# Patient Record
Sex: MALE | Race: WHITE | NOT HISPANIC OR LATINO | Employment: FULL TIME | URBAN - METROPOLITAN AREA
[De-identification: names, ages, dates, MRNs, and addresses within clinical notes are randomized per-mention and may not be internally consistent; named-entity substitution may affect disease eponyms.]

---

## 2017-02-15 ENCOUNTER — OFFICE VISIT (OUTPATIENT)
Dept: FAMILY MEDICINE | Facility: CLINIC | Age: 37
End: 2017-02-15
Payer: COMMERCIAL

## 2017-02-15 VITALS
WEIGHT: 259.06 LBS | OXYGEN SATURATION: 98 % | BODY MASS INDEX: 33.25 KG/M2 | HEIGHT: 74 IN | HEART RATE: 108 BPM | SYSTOLIC BLOOD PRESSURE: 120 MMHG | DIASTOLIC BLOOD PRESSURE: 64 MMHG

## 2017-02-15 DIAGNOSIS — F98.8 ATTENTION DEFICIT DISORDER OF ADULT: ICD-10-CM

## 2017-02-15 DIAGNOSIS — S16.1XXA CERVICAL STRAIN, INITIAL ENCOUNTER: ICD-10-CM

## 2017-02-15 DIAGNOSIS — F11.21 OPIOID TYPE DEPENDENCE IN REMISSION: Primary | ICD-10-CM

## 2017-02-15 PROCEDURE — 99999 PR PBB SHADOW E&M-EST. PATIENT-LVL III: CPT | Mod: PBBFAC,,,

## 2017-02-15 PROCEDURE — 99214 OFFICE O/P EST MOD 30 MIN: CPT | Mod: S$GLB,,,

## 2017-02-15 RX ORDER — DEXTROAMPHETAMINE SACCHARATE, AMPHETAMINE ASPARTATE, DEXTROAMPHETAMINE SULFATE AND AMPHETAMINE SULFATE 7.5; 7.5; 7.5; 7.5 MG/1; MG/1; MG/1; MG/1
1 TABLET ORAL DAILY
Qty: 30 TABLET | Refills: 0 | Status: SHIPPED | OUTPATIENT
Start: 2017-05-02 | End: 2017-02-15 | Stop reason: SDUPTHER

## 2017-02-15 RX ORDER — DEXTROAMPHETAMINE SACCHARATE, AMPHETAMINE ASPARTATE, DEXTROAMPHETAMINE SULFATE AND AMPHETAMINE SULFATE 7.5; 7.5; 7.5; 7.5 MG/1; MG/1; MG/1; MG/1
1 TABLET ORAL DAILY
Qty: 30 TABLET | Refills: 0 | Status: SHIPPED | OUTPATIENT
Start: 2017-04-02 | End: 2017-05-11 | Stop reason: SDUPTHER

## 2017-02-15 RX ORDER — TIZANIDINE 4 MG/1
4 TABLET ORAL EVERY 6 HOURS PRN
Qty: 30 TABLET | Refills: 2 | Status: SHIPPED | OUTPATIENT
Start: 2017-02-15 | End: 2017-02-25

## 2017-02-15 RX ORDER — BUPRENORPHINE AND NALOXONE 8; 2 MG/1; MG/1
8 FILM, SOLUBLE BUCCAL; SUBLINGUAL DAILY
Qty: 30 EACH | Refills: 2 | Status: SHIPPED | OUTPATIENT
Start: 2017-02-15 | End: 2017-05-11

## 2017-02-15 RX ORDER — DEXTROAMPHETAMINE SACCHARATE, AMPHETAMINE ASPARTATE, DEXTROAMPHETAMINE SULFATE AND AMPHETAMINE SULFATE 7.5; 7.5; 7.5; 7.5 MG/1; MG/1; MG/1; MG/1
1 TABLET ORAL DAILY
Qty: 30 TABLET | Refills: 0 | Status: SHIPPED | OUTPATIENT
Start: 2017-03-02 | End: 2017-02-15 | Stop reason: SDUPTHER

## 2017-02-15 NOTE — PATIENT INSTRUCTIONS
"You are being prescribed Buprenorphine/Naloxone for opioid dependency. Buprenorphine is an opioid medication. Buprenorphine is similar to other opioids such as morphine, codeine, and heroin however, it produces less euphoric ("high") effects and therefore may be easier to stop taking.  Naloxone blocks the effects of opioids such as morphine, codeine, and heroin. If buprenorphine and naloxone is injected, naloxone will block the effects of buprenorphine and lead to withdrawal symptoms in a person with opioid dependency. When administered under the tongue as directed, naloxone will not affect the actions of buprenorphine.    Buprenorphine and naloxone can cause drug dependence. This means that withdrawal symptoms may occur if you stop using the medicine too quickly. Withdrawal symptoms may also occur at the start of treatment due to dependence on another drug. Buprenorphine and naloxone is not for occasional ("as needed") use. Do not stop taking buprenorphine and naloxone without first talking to your doctor. Your doctor may want to gradually reduce the dose to avoid or minimize withdrawal symptoms.    Buprenorphine/Naloxone can only be prescribed by certain physicians who have had training in the field of addictive disorders and are certified by the Drug Enforcement Agency (ROLANDO). Buprenorphine/Naloxone is a controlled substance and it's use is regulated by the [place] Board of Medical Examiners, [place] [facility] board and the [employer].     It is important that you are compliant in taking Buprenorphine/Naloxone strictly as prescribed. You have signed an agreement and am aware of the the risk of physical dependency, tolerance or addition to Buprenorphine/Naloxone. You have been informed of the side effects associated with this medication including constipation. You have been informed that certain medications in the benzodiazepine class including diazepam, lorazepam, alprazolam and similar medications " "should not be taken with Buprenorphine/Naloxone. You are aware that withdrawal symptoms including yawning, sweating, watery eyes, runny nose, anxiety, tremors, aching muscles, hot and cold flashes, goose bumps, abdominal pain, diarrhea and depression can occur if you suddenly discontinue or reduce the dose you are prescribed.     You have been informed that this medication should not be taken during pregnancy and you will inform me if you do plan to become or become pregnant during treatment.     You have agreed not to obtain this or other controlled medications from other physicians while taking Buprenorphine/Naloxone. You understand that I will review a history of all controlled medications prescribed to you on the [facility] Prescription Monitoring Program Database and that you must report any other controlled medication use to your physician. You will not take opioid pain medications while taking Buprenorphine/Naloxone. If you have an emergency or acute pain crises you will inform me as soon as possible.     You will come for your regularly scheduled appointments which must be at least every 90 days. You may schedule an appointment early and I can prescribe your medication in advance with a "do not fill until" date. If you have not been evaluated in the past 90 days your prescription will not be renewed. You should schedule an appointment for your return visit before you leave the office today because if I am out of the office for vacation or other reasons there is no other physician that can prescribe this medication in my absence. If you schedule an appointment and I need to cancel that visit my office will contact you to make alternative arrangements.     You should not call for refills of this medications at night, weekends or Holidays without exception.     You have agreed not to use illegal drugs while taking Buprenorphine/Naloxone including marijuana. You understand that I can request a random " drug test at any time and that you have agreed to provide this sample without advanced notice. Failure to comply with this request could result in termination of your treatment with Buprenorphine/Naloxone.     You understand that lost, stolen, misplaced, spilled medications will not be replaced. It is your responsibility to safeguard your medication. You understand that your medication cannot be filled early for any reason including work schedule conflicts, family emergencies, travel and vacations. You also have agreed not to give or sell Buprenorphine/Naloxone to anyone.     You have agreed to take this medication under your tongue properly.     If your insurance requires a prior authorization for this medication it could result in a delay of authorization for payment of your medication. I advise that you obtain information from your insurance and pharmacist to determine if you will require a prior authorization as far in advance as possible.     It is strongly recommended that you seek outpatient substance abuse treatment in addition to medical management with Buprenorphine/Naloxone. If your insurance doesn't provide this benefit or if you can't afford counseling you can seek treatment with your local AA/NA chapter or with your [facility]. Many insurances require that you are actively in outpatient treatment before they will authorize payment for Buprenorphine/Naloxone.

## 2017-02-15 NOTE — MR AVS SNAPSHOT
[facility]  [address] [place] [address]  Phone: [phone]  Fax: [phone]                  Mingo Rico   2/15/2017 9:00 AM   Office Visit    Description:  Male : 1980   Provider:  Jake Arndt Jr., MD   Department:  [facility]           Reason for Visit     Drug / Alcohol Assessment     Neck Pain           Diagnoses this Visit        Comments    Opioid type dependence in remission    -  Primary     Attention deficit disorder of adult         Cervical strain, initial encounter                To Do List           Goals (5 Years of Data)     None      Follow-Up and Disposition     Return in about 3 months (around 5/15/2017).    Follow-up and Disposition History       These Medications        Disp Refills Start End    buprenorphine-naloxone (SUBOXONE) 8-2 mg Film 30 each 2 2/15/2017     Place 1 packet (1 each total) under the tongue once daily. - Sublingual    Pharmacy: [facility]/pharmacy #[id number] - [place], [place] - [address] Ph #: [phone]       dextroamphetamine-amphetamine 30 mg Tab 30 tablet 0 2017    Take 1 tablet by mouth once daily. - Oral    Pharmacy: [facility]/pharmacy #[id number] - [place], [place] - [address] Ph #: [phone]       tizanidine (ZANAFLEX) 4 MG tablet 30 tablet 2 2/15/2017 2017    Take 1 tablet (4 mg total) by mouth every 6 (six) hours as needed. - Oral    Pharmacy: [facility]/pharmacy #[id number] - [place], [place] - [address]  Ph #: [phone]         [facility]     [facility] Care [facility] -  Assistance  Registered nurses in the [facility] provide clinical advisement, health education, appointment booking, and other advisory services.  Call for this free service at [phone].             Medications           Message regarding Medications     Verify the changes and/or additions to your medication regime listed below are the same as discussed with your clinician today.  If any of these  "changes or additions are incorrect, please notify your healthcare provider.        START taking these NEW medications        Refills    tizanidine (ZANAFLEX) 4 MG tablet 2    Sig: Take 1 tablet (4 mg total) by mouth every 6 (six) hours as needed.    Class: Normal    Route: Oral           Verify that the below list of medications is an accurate representation of the medications you are currently taking.  If none reported, the list may be blank. If incorrect, please contact your healthcare provider. Carry this list with you in case of emergency.           Current Medications     buprenorphine-naloxone (SUBOXONE) 8-2 mg Film Place 1 packet (1 each total) under the tongue once daily.    dextroamphetamine-amphetamine 30 mg Tab Starting on Apr 02, 2017. Take 1 tablet by mouth once daily.    tizanidine (ZANAFLEX) 4 MG tablet Take 1 tablet (4 mg total) by mouth every 6 (six) hours as needed.           Clinical Reference Information           Your Vitals Were     BP Pulse Height Weight SpO2 BMI    120/64 (BP Location: Right arm, Patient Position: Sitting, BP Method: Manual) 108 6' 2" (1.88 m) 117.5 kg (259 lb 0.7 oz) 98% 33.26 kg/m2      Blood Pressure          Most Recent Value    BP  120/64      Allergies as of 2/15/2017     No Known Allergies      Immunizations Administered on Date of Encounter - 2/15/2017     None      Instructions    You are being prescribed Buprenorphine/Naloxone for opioid dependency. Buprenorphine is an opioid medication. Buprenorphine is similar to other opioids such as morphine, codeine, and heroin however, it produces less euphoric ("high") effects and therefore may be easier to stop taking.  Naloxone blocks the effects of opioids such as morphine, codeine, and heroin. If buprenorphine and naloxone is injected, naloxone will block the effects of buprenorphine and lead to withdrawal symptoms in a person with opioid dependency. When administered under the tongue as directed, naloxone will not affect " "the actions of buprenorphine.    Buprenorphine and naloxone can cause drug dependence. This means that withdrawal symptoms may occur if you stop using the medicine too quickly. Withdrawal symptoms may also occur at the start of treatment due to dependence on another drug. Buprenorphine and naloxone is not for occasional ("as needed") use. Do not stop taking buprenorphine and naloxone without first talking to your doctor. Your doctor may want to gradually reduce the dose to avoid or minimize withdrawal symptoms.    Buprenorphine/Naloxone can only be prescribed by certain physicians who have had training in the field of addictive disorders and are certified by the Drug Enforcement Agency (R[employer]). Buprenorphine/Naloxone is a controlled substance and it's use is regulated by the [facility] Board of Medical Examiners, [facility] board and the [employer].     It is important that you are compliant in taking Buprenorphine/Naloxone strictly as prescribed. You have signed an agreement and am aware of the the risk of physical dependency, tolerance or addition to Buprenorphine/Naloxone. You have been informed of the side effects associated with this medication including constipation. You have been informed that certain medications in the benzodiazepine class including diazepam, lorazepam, alprazolam and similar medications should not be taken with Buprenorphine/Naloxone. You are aware that withdrawal symptoms including yawning, sweating, watery eyes, runny nose, anxiety, tremors, aching muscles, hot and cold flashes, goose bumps, abdominal pain, diarrhea and depression can occur if you suddenly discontinue or reduce the dose you are prescribed.     You have been informed that this medication should not be taken during pregnancy and you will inform me if you do plan to become or become pregnant during treatment.     You have agreed not to obtain this or other controlled medications from other physicians while " "taking Buprenorphine/Naloxone. You understand that I will review a history of all controlled medications prescribed to you on the [facility] Prescription Monitoring Program Database and that you must report any other controlled medication use to your physician. You will not take opioid pain medications while taking Buprenorphine/Naloxone. If you have an emergency or acute pain crises you will inform me as soon as possible.     You will come for your regularly scheduled appointments which must be at least every 90 days. You may schedule an appointment early and I can prescribe your medication in advance with a "do not fill until" date. If you have not been evaluated in the past 90 days your prescription will not be renewed. You should schedule an appointment for your return visit before you leave the office today because if I am out of the office for vacation or other reasons there is no other physician that can prescribe this medication in my absence. If you schedule an appointment and I need to cancel that visit my office will contact you to make alternative arrangements.     You should not call for refills of this medications at night, weekends or Holidays without exception.     You have agreed not to use illegal drugs while taking Buprenorphine/Naloxone including marijuana. You understand that I can request a random drug test at any time and that you have agreed to provide this sample without advanced notice. Failure to comply with this request could result in termination of your treatment with Buprenorphine/Naloxone.     You understand that lost, stolen, misplaced, spilled medications will not be replaced. It is your responsibility to safeguard your medication. You understand that your medication cannot be filled early for any reason including work schedule conflicts, family emergencies, travel and vacations. You also have agreed not to give or sell Buprenorphine/Naloxone to anyone.     You have agreed " to take this medication under your tongue properly.     If your insurance requires a prior authorization for this medication it could result in a delay of authorization for payment of your medication. I advise that you obtain information from your insurance and pharmacist to determine if you will require a prior authorization as far in advance as possible.     It is strongly recommended that you seek outpatient substance abuse treatment in addition to medical management with Buprenorphine/Naloxone. If your insurance doesn't provide this benefit or if you can't afford counseling you can seek treatment with your local AA/NA chapter or with your [facility]. Many insurances require that you are actively in outpatient treatment before they will authorize payment for Buprenorphine/Naloxone.        Language Assistance Services     ATTENTION: Language assistance services are available, free of charge. Please call [phone].      ATENCIÓN: Si amita lewis, tiene a olivarez disposición servicios gratuitos de asistencia lingüística. Llame al [phone].     OBDULIA Ý: N?u b?n nói Ti?ng Vi?t, có các d?ch v? h? tr? ngôn ng? mi?n phí dành cho b?n. G?i s? [phone].         [facility] complies with applicable Federal civil rights laws and does not discriminate on the basis of race, color, national origin, age, disability, or sex.

## 2017-02-15 NOTE — PROGRESS NOTES
Subjective:       Patient ID: Mingo Rico is a 37 y.o. male.    Chief Complaint: Drug / Alcohol Assessment and Neck Pain    HPI  The patient presents for medical management of opioid dependency. he is receiving maintenance therapy with Suboxone. he is doing well and denies any craving or relapses. he denies any alcohol or substance abuse issues. he has had significant improvements in his relationships, social and occupational functioning. he claims to be compliant with treatment. I have reviewed his profile on the [place] board of Pharmacy Prescription monitoring program website and he appears to be compliant. his last refill for Suboxone 8mg # 30 was on January 15, 2017. The patient understands the risks, benefits and alternatives associated with the medical management of opioid dependency with Suboxone and has signed a Behavior and Pain Agreement for the use of Controlled Drugs.        He is complaining of pain in the right side of his neck since yesterday. His daughter jumped on him when he arrived home from work.        The patient presents for medical management of ADHD. He is taking stimulant medication and it is benefiting him by allowing him to focus and pay attention, complete task, control her hyperactivity and impulsiveness. I reviewed his profile on the [facility] Prescription monitoring website and he appears to be compliant. His last refill of Adderall 30 mg # 30 was on February 2, 2017. He denies any side effects associated with the medication.     Review of Systems   Constitutional: Negative.    Respiratory: Negative.  Negative for shortness of breath.    Cardiovascular: Negative for chest pain.   Psychiatric/Behavioral: Negative.    All other systems reviewed and are negative.      Objective:      Vitals:    02/15/17 0936   BP: 120/64   Pulse: 108     Physical Exam   Constitutional: He is oriented to person, place, and time. He appears well-developed and well-nourished.  He is cooperative. No distress.   HENT:   Head: Normocephalic and atraumatic.   Right Ear: Hearing, tympanic membrane, external ear and ear canal normal.   Left Ear: Hearing, external ear and ear canal normal.   Nose: Nose normal.   Mouth/Throat: Oropharynx is clear and moist.   Eyes: Conjunctivae are normal. Pupils are equal, round, and reactive to light.   Neck: Normal range of motion. Neck supple. No thyromegaly present.   Cardiovascular: Normal rate, regular rhythm, normal heart sounds and intact distal pulses.    Pulmonary/Chest: Effort normal and breath sounds normal. No respiratory distress.   Musculoskeletal: Normal range of motion. He exhibits tenderness. He exhibits no edema.   Base of skull on right    Lymphadenopathy:     He has no cervical adenopathy.   Neurological: He is alert and oriented to person, place, and time. He has normal strength. Coordination and gait normal.   Skin: Skin is warm, dry and intact. No cyanosis. Nails show no clubbing.   Psychiatric: He has a normal mood and affect. His speech is normal and behavior is normal. Judgment and thought content normal. Cognition and memory are normal.   Vitals reviewed.      Assessment:       1. Opioid type dependence in remission    2. Attention deficit disorder of adult    3. Cervical strain, initial encounter        Plan:       Opioid type dependence in remission  -     buprenorphine-naloxone (SUBOXONE) 8-2 mg Film; Place 1 packet (1 each total) under the tongue once daily.  Dispense: 30 each; Refill: 2    Attention deficit disorder of adult    Cervical strain, initial encounter    Other orders  -     Discontinue: dextroamphetamine-amphetamine 30 mg Tab; Take 1 tablet by mouth once daily.  Dispense: 30 tablet; Refill: 0  -     Discontinue: dextroamphetamine-amphetamine 30 mg Tab; Take 1 tablet by mouth once daily.  Dispense: 30 tablet; Refill: 0  -     dextroamphetamine-amphetamine 30 mg Tab; Take 1 tablet by mouth once daily.  Dispense: 30  tablet; Refill: 0  -     tizanidine (ZANAFLEX) 4 MG tablet; Take 1 tablet (4 mg total) by mouth every 6 (six) hours as needed.  Dispense: 30 tablet; Refill: 2      Return in about 3 months (around 5/15/2017).

## 2017-05-11 ENCOUNTER — OFFICE VISIT (OUTPATIENT)
Dept: FAMILY MEDICINE | Facility: CLINIC | Age: 37
End: 2017-05-11
Payer: COMMERCIAL

## 2017-05-11 VITALS
BODY MASS INDEX: 32.29 KG/M2 | HEART RATE: 90 BPM | SYSTOLIC BLOOD PRESSURE: 124 MMHG | OXYGEN SATURATION: 97 % | DIASTOLIC BLOOD PRESSURE: 82 MMHG | RESPIRATION RATE: 18 BRPM | WEIGHT: 251.56 LBS | HEIGHT: 74 IN

## 2017-05-11 DIAGNOSIS — F98.8 ATTENTION DEFICIT DISORDER OF ADULT: ICD-10-CM

## 2017-05-11 DIAGNOSIS — F11.21 OPIOID TYPE DEPENDENCE IN REMISSION: Primary | ICD-10-CM

## 2017-05-11 PROCEDURE — 99999 PR PBB SHADOW E&M-EST. PATIENT-LVL III: CPT | Mod: PBBFAC,,,

## 2017-05-11 PROCEDURE — 1160F RVW MEDS BY RX/DR IN RCRD: CPT | Mod: S$GLB,,,

## 2017-05-11 PROCEDURE — 99214 OFFICE O/P EST MOD 30 MIN: CPT | Mod: S$GLB,,,

## 2017-05-11 RX ORDER — DEXTROAMPHETAMINE SACCHARATE, AMPHETAMINE ASPARTATE, DEXTROAMPHETAMINE SULFATE AND AMPHETAMINE SULFATE 7.5; 7.5; 7.5; 7.5 MG/1; MG/1; MG/1; MG/1
1 TABLET ORAL DAILY
Qty: 30 TABLET | Refills: 0 | Status: SHIPPED | OUTPATIENT
Start: 2017-06-02 | End: 2017-08-15 | Stop reason: SDUPTHER

## 2017-05-11 RX ORDER — BUPRENORPHINE AND NALOXONE 8; 2 MG/1; MG/1
1 FILM, SOLUBLE BUCCAL; SUBLINGUAL DAILY
Qty: 30 EACH | Refills: 2 | Status: SHIPPED | OUTPATIENT
Start: 2017-05-11 | End: 2017-08-15 | Stop reason: SDUPTHER

## 2017-05-11 RX ORDER — DEXTROAMPHETAMINE SACCHARATE, AMPHETAMINE ASPARTATE, DEXTROAMPHETAMINE SULFATE AND AMPHETAMINE SULFATE 7.5; 7.5; 7.5; 7.5 MG/1; MG/1; MG/1; MG/1
1 TABLET ORAL DAILY
Qty: 30 TABLET | Refills: 0 | Status: SHIPPED | OUTPATIENT
Start: 2017-06-11 | End: 2017-05-11 | Stop reason: SDUPTHER

## 2017-05-11 RX ORDER — DEXTROAMPHETAMINE SACCHARATE, AMPHETAMINE ASPARTATE, DEXTROAMPHETAMINE SULFATE AND AMPHETAMINE SULFATE 7.5; 7.5; 7.5; 7.5 MG/1; MG/1; MG/1; MG/1
1 TABLET ORAL DAILY
Qty: 30 TABLET | Refills: 0 | Status: SHIPPED | OUTPATIENT
Start: 2017-05-11 | End: 2017-05-11 | Stop reason: SDUPTHER

## 2017-05-11 RX ORDER — DEXTROAMPHETAMINE SACCHARATE, AMPHETAMINE ASPARTATE, DEXTROAMPHETAMINE SULFATE AND AMPHETAMINE SULFATE 7.5; 7.5; 7.5; 7.5 MG/1; MG/1; MG/1; MG/1
1 TABLET ORAL DAILY
Qty: 30 TABLET | Refills: 0 | Status: SHIPPED | OUTPATIENT
Start: 2017-07-11 | End: 2017-05-11 | Stop reason: SDUPTHER

## 2017-05-11 RX ORDER — DEXTROAMPHETAMINE SACCHARATE, AMPHETAMINE ASPARTATE, DEXTROAMPHETAMINE SULFATE AND AMPHETAMINE SULFATE 7.5; 7.5; 7.5; 7.5 MG/1; MG/1; MG/1; MG/1
1 TABLET ORAL DAILY
Refills: 0 | COMMUNITY
Start: 2017-05-03 | End: 2017-05-11 | Stop reason: SDUPTHER

## 2017-05-11 RX ORDER — DEXTROAMPHETAMINE SACCHARATE, AMPHETAMINE ASPARTATE, DEXTROAMPHETAMINE SULFATE AND AMPHETAMINE SULFATE 7.5; 7.5; 7.5; 7.5 MG/1; MG/1; MG/1; MG/1
1 TABLET ORAL DAILY
Qty: 30 TABLET | Refills: 0 | Status: SHIPPED | OUTPATIENT
Start: 2017-07-03 | End: 2017-08-15 | Stop reason: SDUPTHER

## 2017-05-11 RX ORDER — DEXTROAMPHETAMINE SACCHARATE, AMPHETAMINE ASPARTATE, DEXTROAMPHETAMINE SULFATE AND AMPHETAMINE SULFATE 7.5; 7.5; 7.5; 7.5 MG/1; MG/1; MG/1; MG/1
1 TABLET ORAL DAILY
Qty: 30 TABLET | Refills: 0 | Status: SHIPPED | OUTPATIENT
Start: 2017-08-02 | End: 2017-08-15 | Stop reason: SDUPTHER

## 2017-05-11 NOTE — PATIENT INSTRUCTIONS
"You are being prescribed Buprenorphine/Naloxone for opioid dependency. Buprenorphine is an opioid medication. Buprenorphine is similar to other opioids such as morphine, codeine, and heroin however, it produces less euphoric ("high") effects and therefore may be easier to stop taking.  Naloxone blocks the effects of opioids such as morphine, codeine, and heroin. If buprenorphine and naloxone is injected, naloxone will block the effects of buprenorphine and lead to withdrawal symptoms in a person with opioid dependency. When administered under the tongue as directed, naloxone will not affect the actions of buprenorphine.    Buprenorphine and naloxone can cause drug dependence. This means that withdrawal symptoms may occur if you stop using the medicine too quickly. Withdrawal symptoms may also occur at the start of treatment due to dependence on another drug. Buprenorphine and naloxone is not for occasional ("as needed") use. Do not stop taking buprenorphine and naloxone without first talking to your doctor. Your doctor may want to gradually reduce the dose to avoid or minimize withdrawal symptoms.    Buprenorphine/Naloxone can only be prescribed by certain physicians who have had training in the field of addictive disorders and are certified by the Drug Enforcement Agency ([employer]). Buprenorphine/Naloxone is a controlled substance and it's use is regulated by the [place] Board of Medical Examiners, [place] [employer] [facility] board and the [employer].     It is important that you are compliant in taking Buprenorphine/Naloxone strictly as prescribed. You have signed an agreement and am aware of the the risk of physical dependency, tolerance or addition to Buprenorphine/Naloxone. You have been informed of the side effects associated with this medication including constipation. You have been informed that certain medications in the benzodiazepine class including diazepam, lorazepam, alprazolam and similar medications " "should not be taken with Buprenorphine/Naloxone. You are aware that withdrawal symptoms including yawning, sweating, watery eyes, runny nose, anxiety, tremors, aching muscles, hot and cold flashes, goose bumps, abdominal pain, diarrhea and depression can occur if you suddenly discontinue or reduce the dose you are prescribed.     You have been informed that this medication should not be taken during pregnancy and you will inform me if you do plan to become or become pregnant during treatment.     You have agreed not to obtain this or other controlled medications from other physicians while taking Buprenorphine/Naloxone. You understand that I will review a history of all controlled medications prescribed to you on the [facility] Prescription Monitoring Program Database and that you must report any other controlled medication use to your physician. You will not take opioid pain medications while taking Buprenorphine/Naloxone. If you have an emergency or acute pain crises you will inform me as soon as possible.     You will come for your regularly scheduled appointments which must be at least every 90 days. You may schedule an appointment early and I can prescribe your medication in advance with a "do not fill until" date. If you have not been evaluated in the past 90 days your prescription will not be renewed. You should schedule an appointment for your return visit before you leave the office today because if I am out of the office for vacation or other reasons there is no other physician that can prescribe this medication in my absence. If you schedule an appointment and I need to cancel that visit my office will contact you to make alternative arrangements.     You should not call for refills of this medications at night, weekends or Holidays without exception.     You have agreed not to use illegal drugs while taking Buprenorphine/Naloxone including marijuana. You understand that I can request a random " drug test at any time and that you have agreed to provide this sample without advanced notice. Failure to comply with this request could result in termination of your treatment with Buprenorphine/Naloxone.     You understand that lost, stolen, misplaced, spilled medications will not be replaced. It is your responsibility to safeguard your medication. You understand that your medication cannot be filled early for any reason including work schedule conflicts, family emergencies, travel and vacations. You also have agreed not to give or sell Buprenorphine/Naloxone to anyone.     You have agreed to take this medication under your tongue properly.     If your insurance requires a prior authorization for this medication it could result in a delay of authorization for payment of your medication. I advise that you obtain information from your insurance and pharmacist to determine if you will require a prior authorization as far in advance as possible.     It is strongly recommended that you seek outpatient substance abuse treatment in addition to medical management with Buprenorphine/Naloxone. If your insurance doesn't provide this benefit or if you can't afford counseling you can seek treatment with your local AA/NA chapter or with your [facility]. Many insurances require that you are actively in outpatient treatment before they will authorize payment for Buprenorphine/Naloxone.

## 2017-05-11 NOTE — PROGRESS NOTES
Subjective:       Patient ID: Mingo Rico is a 37 y.o. male.    Chief Complaint: Drug / Alcohol Assessment    HPI The patient presents for medical management of opioid dependency. he is receiving maintenance therapy with Suboxone. he is doing well and denies any craving or relapses. he denies any alcohol or substance abuse issues. he has had significant improvements in his relationships, social and occupational functioning. he claims to be compliant with treatment. I have reviewed his profile on the [facility] Prescription monitoring program website and he appears to be compliant. his last refill for Suboxone 8mg # 30 was on May 3, 2017. The patient understands the risks, benefits and alternatives associated with the medical management of opioid dependency with Suboxone and has signed a Behavior and Pain Agreement for the use of Controlled Drugs.         The patient presents for medical management of ADHD. He is taking stimulant medication and it is benefiting him by allowing him to focus and pay attention, complete task, control her hyperactivity and impulsiveness. I reviewed his profile on the [facility] Prescription monitoring website and he appears to be compliant. His last refill of Adderall 30 mg # 30 was on April 15, 2017. He denies any side effects associated with the medication.     Review of Systems   Constitutional: Negative.    Respiratory: Negative.  Negative for shortness of breath.    Cardiovascular: Negative for chest pain.   Psychiatric/Behavioral: Negative.    All other systems reviewed and are negative.      Objective:      Vitals:    05/11/17 1045   BP: 124/82   Pulse: 90   Resp: 18     Physical Exam   Constitutional: He is oriented to person, place, and time. He appears well-developed and well-nourished. He is cooperative. No distress.   HENT:   Head: Normocephalic and atraumatic.   Right Ear: Hearing, tympanic membrane, external ear and ear canal  normal.   Left Ear: Hearing, external ear and ear canal normal.   Nose: Nose normal.   Mouth/Throat: Oropharynx is clear and moist.   Eyes: Conjunctivae are normal. Pupils are equal, round, and reactive to light.   Neck: Normal range of motion. Neck supple. No thyromegaly present.   Cardiovascular: Normal rate, regular rhythm, normal heart sounds and intact distal pulses.    Pulmonary/Chest: Effort normal and breath sounds normal. No respiratory distress.   Musculoskeletal: Normal range of motion. He exhibits no edema or tenderness.   Lymphadenopathy:     He has no cervical adenopathy.   Neurological: He is alert and oriented to person, place, and time. He has normal strength. Coordination and gait normal.   Skin: Skin is warm, dry and intact. No cyanosis. Nails show no clubbing.   Psychiatric: He has a normal mood and affect. His speech is normal and behavior is normal. Judgment and thought content normal. Cognition and memory are normal.   Vitals reviewed.      Assessment:       1. Opioid type dependence in remission    2. Attention deficit disorder of adult        Plan:       Opioid type dependence in remission  -     Cancel: Buprenorphine and Norbuprenorphine,urine  -     Cancel: Toxicology screen, urine  -     buprenorphine-naloxone (SUBOXONE) 8-2 mg Film; Place 1 packet (1 each total) under the tongue once daily.  Dispense: 30 each; Refill: 2    Attention deficit disorder of adult    Other orders  -     dextroamphetamine-amphetamine 30 mg Tab; Take 1 tablet by mouth once daily.  Dispense: 30 tablet; Refill: 0  -     dextroamphetamine-amphetamine 30 mg Tab; Take 1 tablet by mouth once daily.  Dispense: 30 tablet; Refill: 0  -     dextroamphetamine-amphetamine (ADDERALL) 30 mg Tab; Take 1 tablet by mouth once daily.  Dispense: 30 tablet; Refill: 0      Return in about 3 months (around 8/11/2017).

## 2017-08-11 ENCOUNTER — PATIENT MESSAGE (OUTPATIENT)
Dept: FAMILY MEDICINE | Facility: CLINIC | Age: 37
End: 2017-08-11

## 2017-08-15 ENCOUNTER — OFFICE VISIT (OUTPATIENT)
Dept: FAMILY MEDICINE | Facility: CLINIC | Age: 37
End: 2017-08-15
Payer: COMMERCIAL

## 2017-08-15 VITALS
BODY MASS INDEX: 30.84 KG/M2 | OXYGEN SATURATION: 96 % | SYSTOLIC BLOOD PRESSURE: 110 MMHG | DIASTOLIC BLOOD PRESSURE: 70 MMHG | WEIGHT: 248 LBS | HEART RATE: 88 BPM | HEIGHT: 75 IN

## 2017-08-15 DIAGNOSIS — F11.21 OPIOID TYPE DEPENDENCE IN REMISSION: Primary | ICD-10-CM

## 2017-08-15 DIAGNOSIS — F98.8 ATTENTION DEFICIT DISORDER OF ADULT: ICD-10-CM

## 2017-08-15 PROCEDURE — 99999 PR PBB SHADOW E&M-EST. PATIENT-LVL III: CPT | Mod: PBBFAC,,,

## 2017-08-15 PROCEDURE — 99214 OFFICE O/P EST MOD 30 MIN: CPT | Mod: S$GLB,,,

## 2017-08-15 PROCEDURE — 3008F BODY MASS INDEX DOCD: CPT | Mod: S$GLB,,,

## 2017-08-15 RX ORDER — DEXTROAMPHETAMINE SACCHARATE, AMPHETAMINE ASPARTATE, DEXTROAMPHETAMINE SULFATE AND AMPHETAMINE SULFATE 7.5; 7.5; 7.5; 7.5 MG/1; MG/1; MG/1; MG/1
1 TABLET ORAL DAILY
Qty: 30 TABLET | Refills: 0 | Status: SHIPPED | OUTPATIENT
Start: 2017-11-05 | End: 2017-11-14 | Stop reason: SDUPTHER

## 2017-08-15 RX ORDER — BUPRENORPHINE AND NALOXONE 8; 2 MG/1; MG/1
1 FILM, SOLUBLE BUCCAL; SUBLINGUAL DAILY
Qty: 30 EACH | Refills: 2 | Status: SHIPPED | OUTPATIENT
Start: 2017-08-15 | End: 2017-11-14 | Stop reason: SDUPTHER

## 2017-08-15 RX ORDER — DEXTROAMPHETAMINE SACCHARATE, AMPHETAMINE ASPARTATE, DEXTROAMPHETAMINE SULFATE AND AMPHETAMINE SULFATE 7.5; 7.5; 7.5; 7.5 MG/1; MG/1; MG/1; MG/1
1 TABLET ORAL DAILY
Qty: 30 TABLET | Refills: 0 | Status: SHIPPED | OUTPATIENT
Start: 2017-09-05 | End: 2017-11-14 | Stop reason: SDUPTHER

## 2017-08-15 RX ORDER — DEXTROAMPHETAMINE SACCHARATE, AMPHETAMINE ASPARTATE, DEXTROAMPHETAMINE SULFATE AND AMPHETAMINE SULFATE 7.5; 7.5; 7.5; 7.5 MG/1; MG/1; MG/1; MG/1
1 TABLET ORAL DAILY
Qty: 30 TABLET | Refills: 0 | Status: SHIPPED | OUTPATIENT
Start: 2017-10-05 | End: 2017-11-14 | Stop reason: SDUPTHER

## 2017-08-15 NOTE — PROGRESS NOTES
Subjective:       Patient ID: Mingo Rico is a 37 y.o. male.    Chief Complaint: Drug / Alcohol Assessment and ADHD    HPI The patient presents for medical management of opioid dependency. he is receiving maintenance therapy with Suboxone. he is doing well and denies any craving or relapses. he denies any alcohol or substance abuse issues. he has had significant improvements in his relationships, social and occupational functioning. he claims to be compliant with treatment. I have reviewed his profile on the [facility] Prescription monitoring program website and he appears to be compliant. his last refill for Suboxone 8mg # 30 was on July 16, 2017. The patient understands the risks, benefits and alternatives associated with the medical management of opioid dependency with Suboxone and has signed a Behavior and Pain Agreement for the use of Controlled Drugs.          The patient presents for medical management of ADHD. He is taking stimulant medication and it is benefiting him by allowing him to focus and pay attention, complete task, control her hyperactivity and impulsiveness. I reviewed his profile on the [facility] Prescription monitoring website and he appears to be compliant. His last refill of Adderall 30 mg # 30 was on July 16, 2017. He denies any side effects associated with the medication.     Review of Systems   Constitutional: Negative.    Respiratory: Negative.  Negative for shortness of breath.    Cardiovascular: Negative for chest pain.   Psychiatric/Behavioral: Negative.    All other systems reviewed and are negative.      Objective:      Vitals:    08/15/17 0930   BP: 110/70   Pulse: 88     Physical Exam   Constitutional: He is oriented to person, place, and time. He appears well-developed and well-nourished. He is cooperative. No distress.   HENT:   Head: Normocephalic and atraumatic.   Right Ear: Hearing, tympanic membrane, external ear and ear canal  normal.   Left Ear: Hearing, external ear and ear canal normal.   Nose: Nose normal.   Mouth/Throat: Oropharynx is clear and moist.   Eyes: Conjunctivae are normal. Pupils are equal, round, and reactive to light.   Neck: Normal range of motion. Neck supple. No thyromegaly present.   Cardiovascular: Normal rate, regular rhythm, normal heart sounds and intact distal pulses.    Pulmonary/Chest: Effort normal and breath sounds normal. No respiratory distress.   Musculoskeletal: Normal range of motion. He exhibits no edema or tenderness.   Lymphadenopathy:     He has no cervical adenopathy.   Neurological: He is alert and oriented to person, place, and time. He has normal strength. Coordination and gait normal.   Skin: Skin is warm, dry and intact. No cyanosis. Nails show no clubbing.   Psychiatric: He has a normal mood and affect. His speech is normal and behavior is normal. Judgment and thought content normal. Cognition and memory are normal.   Vitals reviewed.      Assessment:       1. Opioid type dependence in remission    2. Attention deficit disorder of adult        Plan:       Opioid type dependence in remission  -     buprenorphine-naloxone (SUBOXONE) 8-2 mg Film; Place 1 packet (1 each total) under the tongue once daily.  Dispense: 30 each; Refill: 2    Attention deficit disorder of adult    Other orders  -     dextroamphetamine-amphetamine (ADDERALL) 30 mg Tab; Take 1 tablet by mouth once daily.  Dispense: 30 tablet; Refill: 0  -     dextroamphetamine-amphetamine 30 mg Tab; Take 1 tablet by mouth once daily.  Dispense: 30 tablet; Refill: 0  -     dextroamphetamine-amphetamine 30 mg Tab; Take 1 tablet by mouth once daily.  Dispense: 30 tablet; Refill: 0      Return in about 3 months (around 11/15/2017).

## 2017-08-15 NOTE — PATIENT INSTRUCTIONS
"You are being prescribed Buprenorphine/Naloxone for opioid dependency. Buprenorphine is an opioid medication. Buprenorphine is similar to other opioids such as morphine, codeine, and heroin however, it produces less euphoric ("high") effects and therefore may be easier to stop taking.  Naloxone blocks the effects of opioids such as morphine, codeine, and heroin. If buprenorphine and naloxone is injected, naloxone will block the effects of buprenorphine and lead to withdrawal symptoms in a person with opioid dependency. When administered under the tongue as directed, naloxone will not affect the actions of buprenorphine.    Buprenorphine and naloxone can cause drug dependence. This means that withdrawal symptoms may occur if you stop using the medicine too quickly. Withdrawal symptoms may also occur at the start of treatment due to dependence on another drug. Buprenorphine and naloxone is not for occasional ("as needed") use. Do not stop taking buprenorphine and naloxone without first talking to your doctor. Your doctor may want to gradually reduce the dose to avoid or minimize withdrawal symptoms.    Buprenorphine/Naloxone can only be prescribed by certain physicians who have had training in the field of addictive disorders and are certified by the Drug Enforcement Agency ([employer]). Buprenorphine/Naloxone is a controlled substance and it's use is regulated by the [place] Board of Medical Examiners, [place] [employer] [facility] board and the [employer].     It is important that you are compliant in taking Buprenorphine/Naloxone strictly as prescribed. You have signed an agreement and am aware of the the risk of physical dependency, tolerance or addition to Buprenorphine/Naloxone. You have been informed of the side effects associated with this medication including constipation. You have been informed that certain medications in the benzodiazepine class including diazepam, lorazepam, alprazolam and similar medications " "should not be taken with Buprenorphine/Naloxone. You are aware that withdrawal symptoms including yawning, sweating, watery eyes, runny nose, anxiety, tremors, aching muscles, hot and cold flashes, goose bumps, abdominal pain, diarrhea and depression can occur if you suddenly discontinue or reduce the dose you are prescribed.     You have been informed that this medication should not be taken during pregnancy and you will inform me if you do plan to become or become pregnant during treatment.     You have agreed not to obtain this or other controlled medications from other physicians while taking Buprenorphine/Naloxone. You understand that I will review a history of all controlled medications prescribed to you on the [facility] Prescription Monitoring Program Database and that you must report any other controlled medication use to your physician. You will not take opioid pain medications while taking Buprenorphine/Naloxone. If you have an emergency or acute pain crises you will inform me as soon as possible.     You will come for your regularly scheduled appointments which must be at least every 90 days. You may schedule an appointment early and I can prescribe your medication in advance with a "do not fill until" date. If you have not been evaluated in the past 90 days your prescription will not be renewed. You should schedule an appointment for your return visit before you leave the office today because if I am out of the office for vacation or other reasons there is no other physician that can prescribe this medication in my absence. If you schedule an appointment and I need to cancel that visit my office will contact you to make alternative arrangements.     You should not call for refills of this medications at night, weekends or Holidays without exception.     You have agreed not to use illegal drugs while taking Buprenorphine/Naloxone including marijuana. You understand that I can request a random " drug test at any time and that you have agreed to provide this sample without advanced notice. Failure to comply with this request could result in termination of your treatment with Buprenorphine/Naloxone.     You understand that lost, stolen, misplaced, spilled medications will not be replaced. It is your responsibility to safeguard your medication. You understand that your medication cannot be filled early for any reason including work schedule conflicts, family emergencies, travel and vacations. You also have agreed not to give or sell Buprenorphine/Naloxone to anyone.     You have agreed to take this medication under your tongue properly.     If your insurance requires a prior authorization for this medication it could result in a delay of authorization for payment of your medication. I advise that you obtain information from your insurance and pharmacist to determine if you will require a prior authorization as far in advance as possible.     It is strongly recommended that you seek outpatient substance abuse treatment in addition to medical management with Buprenorphine/Naloxone. If your insurance doesn't provide this benefit or if you can't afford counseling you can seek treatment with your local AA/NA chapter or with your [facility]. Many insurances require that you are actively in outpatient treatment before they will authorize payment for Buprenorphine/Naloxone.

## 2017-11-14 ENCOUNTER — OFFICE VISIT (OUTPATIENT)
Dept: FAMILY MEDICINE | Facility: CLINIC | Age: 37
End: 2017-11-14
Payer: COMMERCIAL

## 2017-11-14 VITALS
HEIGHT: 75 IN | DIASTOLIC BLOOD PRESSURE: 70 MMHG | SYSTOLIC BLOOD PRESSURE: 120 MMHG | HEART RATE: 56 BPM | OXYGEN SATURATION: 99 % | WEIGHT: 236.81 LBS | BODY MASS INDEX: 29.45 KG/M2

## 2017-11-14 DIAGNOSIS — F11.20 OPIOID DEPENDENCE ON AGONIST THERAPY: Primary | ICD-10-CM

## 2017-11-14 DIAGNOSIS — F11.21 OPIOID TYPE DEPENDENCE IN REMISSION: ICD-10-CM

## 2017-11-14 DIAGNOSIS — F98.8 ATTENTION DEFICIT DISORDER OF ADULT: ICD-10-CM

## 2017-11-14 PROCEDURE — 99999 PR PBB SHADOW E&M-EST. PATIENT-LVL III: CPT | Mod: PBBFAC,,,

## 2017-11-14 PROCEDURE — 99214 OFFICE O/P EST MOD 30 MIN: CPT | Mod: S$GLB,,,

## 2017-11-14 RX ORDER — DEXTROAMPHETAMINE SACCHARATE, AMPHETAMINE ASPARTATE, DEXTROAMPHETAMINE SULFATE AND AMPHETAMINE SULFATE 7.5; 7.5; 7.5; 7.5 MG/1; MG/1; MG/1; MG/1
1 TABLET ORAL DAILY
Qty: 30 TABLET | Refills: 0 | Status: SHIPPED | OUTPATIENT
Start: 2018-01-14 | End: 2018-02-13

## 2017-11-14 RX ORDER — DEXTROAMPHETAMINE SACCHARATE, AMPHETAMINE ASPARTATE, DEXTROAMPHETAMINE SULFATE AND AMPHETAMINE SULFATE 7.5; 7.5; 7.5; 7.5 MG/1; MG/1; MG/1; MG/1
1 TABLET ORAL DAILY
Qty: 30 TABLET | Refills: 0 | Status: SHIPPED | OUTPATIENT
Start: 2017-11-14 | End: 2018-02-14 | Stop reason: SDUPTHER

## 2017-11-14 RX ORDER — BUPRENORPHINE AND NALOXONE 8; 2 MG/1; MG/1
1 FILM, SOLUBLE BUCCAL; SUBLINGUAL DAILY
Qty: 30 EACH | Refills: 2 | Status: SHIPPED | OUTPATIENT
Start: 2017-11-14 | End: 2017-12-14 | Stop reason: SDUPTHER

## 2017-11-14 RX ORDER — DEXTROAMPHETAMINE SACCHARATE, AMPHETAMINE ASPARTATE, DEXTROAMPHETAMINE SULFATE AND AMPHETAMINE SULFATE 7.5; 7.5; 7.5; 7.5 MG/1; MG/1; MG/1; MG/1
1 TABLET ORAL DAILY
Qty: 30 TABLET | Refills: 0 | Status: SHIPPED | OUTPATIENT
Start: 2017-12-14 | End: 2018-02-14

## 2017-11-14 NOTE — PROGRESS NOTES
Subjective:       Patient ID: Mingo Rico is a 37 y.o. male.    Chief Complaint: Drug / Alcohol Assessment    HPI The patient presents for medical management of opioid dependency. he is receiving maintenance therapy with Suboxone. he is doing well and denies any craving or relapses. he denies any alcohol or substance abuse issues. he has had significant improvements in his relationships, social and occupational functioning. he claims to be compliant with treatment. I have reviewed his profile on the [facility] of Pharmacy Prescription monitoring program website and he appears to be compliant. his last refill for Suboxone 8mg # 30 was on October 15, 2017. The patient understands the risks, benefits and alternatives associated with the medical management of opioid dependency with Suboxone and has signed a Behavior and Pain Agreement for the use of Controlled Drugs.     Review of Systems   Constitutional: Negative.    Respiratory: Negative.  Negative for shortness of breath.    Cardiovascular: Negative for chest pain.   Psychiatric/Behavioral: Negative.    All other systems reviewed and are negative.      Objective:      Vitals:    11/14/17 1518   BP: 120/70   Pulse: (!) 56     Physical Exam   Constitutional: He is oriented to person, place, and time. He appears well-developed and well-nourished. He is cooperative. No distress.   HENT:   Head: Normocephalic and atraumatic.   Right Ear: Hearing, tympanic membrane, external ear and ear canal normal.   Left Ear: Hearing, external ear and ear canal normal.   Nose: Nose normal.   Mouth/Throat: Oropharynx is clear and moist.   Eyes: Conjunctivae are normal. Pupils are equal, round, and reactive to light.   Neck: Normal range of motion. Neck supple. No thyromegaly present.   Cardiovascular: Normal rate, regular rhythm, normal heart sounds and intact distal pulses.    Pulmonary/Chest: Effort normal and breath sounds normal. No respiratory distress.    Musculoskeletal: Normal range of motion. He exhibits no edema or tenderness.   Lymphadenopathy:     He has no cervical adenopathy.   Neurological: He is alert and oriented to person, place, and time. He has normal strength. Coordination and gait normal.   Skin: Skin is warm, dry and intact. No cyanosis. Nails show no clubbing.   Psychiatric: He has a normal mood and affect. His speech is normal and behavior is normal. Judgment and thought content normal. Cognition and memory are normal.   Vitals reviewed.      Assessment:       1. Opioid dependence on agonist therapy    2. Attention deficit disorder of adult    3. Opioid type dependence in remission        Plan:       Opioid dependence on agonist therapy    Attention deficit disorder of adult    Opioid type dependence in remission  -     buprenorphine-naloxone (SUBOXONE) 8-2 mg Film; Place 1 packet (1 each total) under the tongue once daily.  Dispense: 30 each; Refill: 2    Other orders  -     dextroamphetamine-amphetamine (ADDERALL) 30 mg Tab; Take 1 tablet by mouth once daily.  Dispense: 30 tablet; Refill: 0  -     dextroamphetamine-amphetamine 30 mg Tab; Take 1 tablet by mouth once daily.  Dispense: 30 tablet; Refill: 0  -     dextroamphetamine-amphetamine 30 mg Tab; Take 1 tablet by mouth once daily.  Dispense: 30 tablet; Refill: 0      Return in about 3 months (around 2/14/2018).

## 2017-11-14 NOTE — PATIENT INSTRUCTIONS
"You are being prescribed opioid pain medications for chronic pain.     Opioid pain medications can cause drug dependence. This means that withdrawal symptoms may occur if you stop using the medicine too quickly.     Opioid pain medications can only be prescribed in compliance with chronic opioid pain medication regulations of the [facility] Board of Medical Examiners, the [facility] board and the Federal Drug Enforcement Agency ([place]).     It is important that you are compliant in taking Opioid pain medications strictly as prescribed. You have signed an agreement and are aware of the risk of physical dependency, tolerance or addiction to Opioid pain medications. You have been informed of the side effects associated with this medication including constipation. You are aware that withdrawal symptoms including yawning, sweating, watery eyes, runny nose, anxiety, tremors, aching muscles, hot and cold flashes, goose bumps, abdominal pain, diarrhea and depression can occur if you suddenly discontinue or reduce the dose you are prescribed.     You have been informed that this medication should not be taken during pregnancy and you will inform me if you do plan to become or become pregnant during treatment.     You have agreed not to obtain this or other controlled medications from other physicians while taking Opioid pain medications. You understand that I will review a history of all controlled medications prescribed to you on the [facility] Prescription Monitoring Program Database and that you must report any other controlled medication use to your physician. If you have an emergency or acute pain crisis you will inform me as soon as possible.     You will come for your regularly scheduled appointments which must be at least every 90 days. You may schedule an appointment early and I can prescribe your medication in advance with a "do not fill until" date. If you have not been evaluated in the " past 90 days your prescription will not be renewed. You should schedule an appointment for your return visit before you leave the office today because if I am out of the office for vacation or other reasons there is no other physician that can prescribe this medication in my absence. If you schedule an appointment and I need to cancel that visit my office will contact you to make alternative arrangements.     You should not call for refills of this medication at night, weekends or Holidays without exception.     You have agreed not to use illegal drugs while taking Opioid pain medications including marijuana. You understand that I can request a random drug test at any time and that you have agreed to provide this sample without advanced notice. Failure to comply with this request could result in termination of your treatment with Opioid pain medications.     You understand that lost, stolen, misplaced, spilled medications will not be replaced. It is your responsibility to safeguard your medication. You understand that your medication cannot be filled early for any reason including work schedule conflicts, family emergencies, travel and vacations. You also have agreed not to give or sell Opioid pain medications to anyone.

## 2017-12-14 DIAGNOSIS — F11.21 OPIOID TYPE DEPENDENCE IN REMISSION: ICD-10-CM

## 2017-12-14 RX ORDER — BUPRENORPHINE HYDROCHLORIDE, NALOXONE HYDROCHLORIDE 8; 2 MG/1; MG/1
FILM, SOLUBLE BUCCAL; SUBLINGUAL
Qty: 30 EACH | Refills: 1 | Status: SHIPPED | OUTPATIENT
Start: 2017-12-14 | End: 2018-02-14 | Stop reason: SDUPTHER

## 2018-02-14 ENCOUNTER — OFFICE VISIT (OUTPATIENT)
Dept: FAMILY MEDICINE | Facility: CLINIC | Age: 38
End: 2018-02-14

## 2018-02-14 VITALS
OXYGEN SATURATION: 94 % | BODY MASS INDEX: 30.8 KG/M2 | HEIGHT: 74 IN | WEIGHT: 240 LBS | DIASTOLIC BLOOD PRESSURE: 80 MMHG | HEART RATE: 101 BPM | SYSTOLIC BLOOD PRESSURE: 110 MMHG

## 2018-02-14 DIAGNOSIS — F98.8 ATTENTION DEFICIT DISORDER OF ADULT: ICD-10-CM

## 2018-02-14 DIAGNOSIS — F11.21 OPIOID TYPE DEPENDENCE IN REMISSION: ICD-10-CM

## 2018-02-14 DIAGNOSIS — F11.20 OPIOID DEPENDENCE ON AGONIST THERAPY: Primary | ICD-10-CM

## 2018-02-14 PROCEDURE — 99213 OFFICE O/P EST LOW 20 MIN: CPT | Mod: PBBFAC,PO

## 2018-02-14 PROCEDURE — 99999 PR PBB SHADOW E&M-EST. PATIENT-LVL III: CPT | Mod: PBBFAC,,,

## 2018-02-14 PROCEDURE — 99213 OFFICE O/P EST LOW 20 MIN: CPT | Mod: S$PBB,,,

## 2018-02-14 PROCEDURE — 3008F BODY MASS INDEX DOCD: CPT | Mod: ,,,

## 2018-02-14 RX ORDER — BUPRENORPHINE AND NALOXONE 8; 2 MG/1; MG/1
FILM, SOLUBLE BUCCAL; SUBLINGUAL
Qty: 30 EACH | Refills: 2 | Status: SHIPPED | OUTPATIENT
Start: 2018-02-14 | End: 2018-05-21 | Stop reason: SDUPTHER

## 2018-02-14 RX ORDER — DEXTROAMPHETAMINE SACCHARATE, AMPHETAMINE ASPARTATE, DEXTROAMPHETAMINE SULFATE AND AMPHETAMINE SULFATE 7.5; 7.5; 7.5; 7.5 MG/1; MG/1; MG/1; MG/1
1 TABLET ORAL DAILY
Qty: 30 TABLET | Refills: 0 | Status: SHIPPED | OUTPATIENT
Start: 2018-02-14 | End: 2018-04-13 | Stop reason: SDUPTHER

## 2018-02-14 RX ORDER — DEXTROAMPHETAMINE SACCHARATE, AMPHETAMINE ASPARTATE, DEXTROAMPHETAMINE SULFATE AND AMPHETAMINE SULFATE 7.5; 7.5; 7.5; 7.5 MG/1; MG/1; MG/1; MG/1
1 TABLET ORAL DAILY
Qty: 30 TABLET | Refills: 0 | Status: SHIPPED | OUTPATIENT
Start: 2018-02-14 | End: 2018-02-14 | Stop reason: SDUPTHER

## 2018-02-14 NOTE — PROGRESS NOTES
Subjective:       Patient ID: Mingo Rico is a 38 y.o. male.    Chief Complaint: Alcohol Problem and ADHD    HPI The patient presents for medical management of opioid dependency. he is receiving maintenance therapy with Suboxone. he is doing well and denies any craving or relapses. he denies any alcohol or substance abuse issues. he has had significant improvements in his relationships, social and occupational functioning. he claims to be compliant with treatment. I have reviewed his profile on the [facility] board of Pharmacy Prescription monitoring program website and he appears to be compliant. his last refill for Suboxone 8mg # 30 was on January 15, 2018. The patient understands the risks, benefits and alternatives associated with the medical management of opioid dependency with Suboxone and has signed a Behavior and Pain Agreement for the use of Controlled Drugs.     Review of Systems   Constitutional: Negative.    Respiratory: Negative.  Negative for shortness of breath.    Cardiovascular: Negative for chest pain.   Psychiatric/Behavioral: Negative.    All other systems reviewed and are negative.      Objective:      Vitals:    02/14/18 0905   BP: 110/80   Pulse: 101     Physical Exam   Constitutional: He is oriented to person, place, and time. He appears well-developed and well-nourished. He is cooperative. No distress.   HENT:   Head: Normocephalic and atraumatic.   Right Ear: Hearing, tympanic membrane, external ear and ear canal normal.   Left Ear: Hearing, external ear and ear canal normal.   Nose: Nose normal.   Mouth/Throat: Oropharynx is clear and moist.   Eyes: Conjunctivae are normal. Pupils are equal, round, and reactive to light.   Neck: Normal range of motion. Neck supple. No thyromegaly present.   Cardiovascular: Normal rate, regular rhythm, normal heart sounds and intact distal pulses.    Pulmonary/Chest: Effort normal and breath sounds normal. No respiratory distress.   Musculoskeletal:  Normal range of motion. He exhibits no edema or tenderness.   Lymphadenopathy:     He has no cervical adenopathy.   Neurological: He is alert and oriented to person, place, and time. He has normal strength. Coordination and gait normal.   Skin: Skin is warm, dry and intact. No cyanosis. Nails show no clubbing.   Psychiatric: He has a normal mood and affect. His speech is normal and behavior is normal. Judgment and thought content normal. Cognition and memory are normal.   Vitals reviewed.      Assessment:       1. Opioid dependence on agonist therapy    2. Attention deficit disorder of adult    3. Opioid type dependence in remission        Plan:       Opioid dependence on agonist therapy    Attention deficit disorder of adult    Opioid type dependence in remission  -     buprenorphine-naloxone (SUBOXONE) 8-2 mg Film; take 1 FILM under the tongue once daily  Dispense: 30 each; Refill: 2    Other orders  -     Discontinue: dextroamphetamine-amphetamine (ADDERALL) 30 mg Tab; Take 1 tablet by mouth once daily.  Dispense: 30 tablet; Refill: 0  -     dextroamphetamine-amphetamine (ADDERALL) 30 mg Tab; Take 1 tablet by mouth once daily.  Dispense: 30 tablet; Refill: 0      Follow-up if symptoms worsen or fail to improve.

## 2018-04-13 RX ORDER — DEXTROAMPHETAMINE SACCHARATE, AMPHETAMINE ASPARTATE, DEXTROAMPHETAMINE SULFATE AND AMPHETAMINE SULFATE 7.5; 7.5; 7.5; 7.5 MG/1; MG/1; MG/1; MG/1
1 TABLET ORAL DAILY
Qty: 30 TABLET | Refills: 0 | Status: SHIPPED | OUTPATIENT
Start: 2018-04-13 | End: 2018-05-21 | Stop reason: SDUPTHER

## 2018-05-15 ENCOUNTER — TELEPHONE (OUTPATIENT)
Dept: FAMILY MEDICINE | Facility: CLINIC | Age: 38
End: 2018-05-15

## 2018-05-15 NOTE — TELEPHONE ENCOUNTER
----- Message from [place] sent at 5/15/2018  3:03 PM CDT -----  Contact: Patient  Mingo Rico  MRN: [id number]  : 1980  PCP: Jake Arndt (Inactive)  Home Phone      [phone]  Work Phone      Not on file.  Mobile          [phone]      MESSAGE:  New patient -- was seeing Dr Jake Arndt for Suboxone  (10 yrs)  -- was weaning down -- no insurance -- wants to know if Dr Valdes will take him on as a patient    Call [phone]    PCP: Hair - ?????

## 2018-05-21 ENCOUNTER — OFFICE VISIT (OUTPATIENT)
Dept: FAMILY MEDICINE | Facility: CLINIC | Age: 38
End: 2018-05-21

## 2018-05-21 VITALS
BODY MASS INDEX: 30.48 KG/M2 | SYSTOLIC BLOOD PRESSURE: 130 MMHG | OXYGEN SATURATION: 92 % | HEIGHT: 74 IN | WEIGHT: 237.5 LBS | DIASTOLIC BLOOD PRESSURE: 80 MMHG | HEART RATE: 62 BPM

## 2018-05-21 DIAGNOSIS — F11.21 OPIOID DEPENDENCE IN REMISSION: ICD-10-CM

## 2018-05-21 DIAGNOSIS — F98.8 ATTENTION DEFICIT DISORDER OF ADULT: ICD-10-CM

## 2018-05-21 DIAGNOSIS — F11.21 OPIOID TYPE DEPENDENCE IN REMISSION: Primary | ICD-10-CM

## 2018-05-21 LAB
AMP D-AMPHETAMINE 1000 NG/ML: POSITIVE
BAR SECOBARBITAL 300 NG/ML: NEGATIVE
BUP BUPRENORPHINE 10 NG/ML: POSITIVE
BZO OXAZEPAM 300 NG/ML: NEGATIVE
COC BENZOYLECGONINE 300 NG/ML: NEGATIVE
CTP QC/QA: YES
MET D-METHAMPHETAMINE 500 NG/ML: NEGATIVE
MOP MORPHINE 300 NG/ML: NEGATIVE
MTD METHADONE 300 NG/ML: NEGATIVE
QXY OXYCODONE 100 NG/ML: NEGATIVE
THC 11-NOR-9-TETRAHYDROCANNABINOL-9-CARBOXYLIC ACID: NEGATIVE

## 2018-05-21 PROCEDURE — 99999 PR PBB SHADOW E&M-EST. PATIENT-LVL III: CPT | Mod: PBBFAC,,, | Performed by: FAMILY MEDICINE

## 2018-05-21 PROCEDURE — 80305 DRUG TEST PRSMV DIR OPT OBS: CPT | Mod: PBBFAC,PO | Performed by: FAMILY MEDICINE

## 2018-05-21 PROCEDURE — 99214 OFFICE O/P EST MOD 30 MIN: CPT | Mod: S$PBB,,, | Performed by: FAMILY MEDICINE

## 2018-05-21 PROCEDURE — 99213 OFFICE O/P EST LOW 20 MIN: CPT | Mod: PBBFAC,PO | Performed by: FAMILY MEDICINE

## 2018-05-21 RX ORDER — DEXTROAMPHETAMINE SACCHARATE, AMPHETAMINE ASPARTATE, DEXTROAMPHETAMINE SULFATE AND AMPHETAMINE SULFATE 7.5; 7.5; 7.5; 7.5 MG/1; MG/1; MG/1; MG/1
1 TABLET ORAL DAILY
Qty: 30 TABLET | Refills: 0 | Status: SHIPPED | OUTPATIENT
Start: 2018-05-21

## 2018-05-21 RX ORDER — BUPRENORPHINE AND NALOXONE 8; 2 MG/1; MG/1
FILM, SOLUBLE BUCCAL; SUBLINGUAL
Qty: 30 EACH | Refills: 2 | Status: SHIPPED | OUTPATIENT
Start: 2018-05-21

## 2018-05-21 NOTE — PATIENT INSTRUCTIONS
Buprenorphine sublingual tablets  What is this medicine?  BUPRENORPHINE (byoo pre NOR feen) is used to treat certain types of drug dependence.  How should I use this medicine?  The medicine is placed under the tongue and allowed to dissolve. Several minutes will be needed to allow the tablet to fully dissolve. If your dose requires you to take more than 2 tablets at once, either place all the tablets at once under the tongue, or if you cannot fit more than 2 tablets comfortably, place 2 tablets at a time under the tongue. Either way, you should hold the tablets under the tongue until they completely dissolve. Do not swallow or chew the tablet. Do not inject the medicine. Take your medicine at regular intervals. Do not take your medicine more often than directed.  A special MedGuide will be given to you by your healthcare provider each time this medicine is inserted into your arm. Be sure to read this information carefully each time.  Talk to your pediatrician regarding the use of this medicine in children. While this drug may be prescribed for children as young as 16 years of age for selected conditions, precautions do apply.  What side effects may I notice from receiving this medicine?  Side effects that you should report to your doctor or health care professional as soon as possible:  · allergic reactions like skin rash, itching or hives, swelling of the face, lips, or tongue  · breathing problems  · confusion  · signs and symptoms of a dangerous change in heartbeat or heart rhythm like chest pain; dizziness; fast or irregular heartbeat; palpitations; feeling faint or lightheaded, falls; breathing problems  · signs and symptoms of liver injury like dark yellow or brown urine; general ill feeling or flu-like symptoms; light-colored stools; loss of appetite; nausea; right upper belly pain; unusually weak or tired; yellow of the eyes or skin  · signs and symptoms of low blood pressure like dizziness; feeling faint  or lightheaded, falls; unusually weak or tired  · trouble passing urine or change in the amount of urine  Side effects that usually do not require medical attention (report to your doctor or health care professional if they continue or are bothersome):  · constipation  · dry mouth  · nausea, vomiting  · tiredness  What may interact with this medicine?  Do not take this medication with any of the following medicines:  · cisapride  · certain medicines for fungal infections like ketoconazole and itraconazole  · dofetilide  · dronedarone  · pimozide  · ritonavir  · thioridazine  · ziprasidone  This medicine may interact with the following medications:  · alcohol  · antihistamines for allergy, cough and cold  · antiviral medicines for HIV or AIDS  · atropine  · certain antibiotics like clarithromycin, erythromycin, linezold, rifampin  · certain medicines for anxiety or sleep  · certain medicines for bladder problems like oxybutynin, tolterodine  · certain medicines for depression like amitriptyline, fluoxetine, sertraline  · certain medicines for migraine headache like almotriptan, eletriptan, frovatriptan, naratriptan, rizatriptan, sumatriptan, zolmitriptan  · certain medicines for nausea or vomiting like dolasetron, ondansetron, palonosetron  · certain medicines for Parkinson's disease like benztropine, trihexyphenidyl  · certain medicines for seizures like phenobarbital, primidone  · certain medicines for stomach problems like cimetidine, dicyclomine, hyoscyamine  · certain medicines for travel sickness like scopolamine  · diuretics  · general anesthetics like halothane, isoflurane, methoxyflurane, propofol  · ipratropium  · local anesthetics like lidocaine, pramoxine, tetracaine  · MAOIs like Carbex, Eldepryl, Marplan, Nardil, and Parnate  · medicines that relax muscles for surgery  · methylene blue  · other medicines that prolong the QT interval (cause an abnormal heart rhythm)  · other narcotic medicines for pain  or cough  · phenothiazines like chlorpromazine, mesoridazine, prochlorperazine, thioridazine  What if I miss a dose?  If you miss a dose, take it as soon as you can. If it is almost time for your next dose, take only that dose. Do not take double or extra doses.  Where should I keep my medicine?  Keep out of the reach of children. This medicine can be abused. Keep your medicine in a safe place to protect it from theft. Do not share this medicine with anyone. Selling or giving away this medicine is dangerous and against the law.  Store at room temperature between 15 and 30 degrees C (56 and 86 degrees F).  This medicine may cause accidental overdose and death if it is taken by other adults, children, or pets. Flush any unused medicine down the toilet to reduce the chance of harm. Do not use the medicine after the expiration date.  What should I tell my health care provider before I take this medicine?  They need to know if you have any of these conditions:  · brain tumor  · drink more than 3 alcohol-containing drinks per day  · head injury  · heart disease  · kidney disease  · liver disease  · lung disease such as asthma or COPD  · an unusual or allergic reaction to buprenorphine, naloxone, morphine, codeine, other medicines, lactose, mannitol, foods, dyes, or preservatives  · pregnant or trying to get pregnant  · breast-feeding  What should I watch for while using this medicine?  Tell your doctor or healthcare professional if your symptoms do not start to get better or if they get worse.  Do not stop taking except on your doctor's advice. You may develop a severe reaction. Your doctor will tell you how much medicine to take.  If you are also taking a narcotic medicine for pain or cough or another medicine that also causes drowsiness, you may have more side effects. Give your health care provider a list of all medicines you use. Your doctor will tell you how much medicine to take. Do not take more medicine than  directed. Call emergency for help if you have problems breathing or unusual sleepiness.  You may get drowsy or dizzy. Do not drive, use machinery, or do anything that needs mental alertness until you know how this medicine affects you. Do not stand or sit up quickly, especially if you are an older patient. This reduces the risk of dizzy or fainting spells. Alcohol may interfere with the effect of this medicine. Avoid alcoholic drinks.  Wear a medical ID bracelet or chain, and carry a card that describes your disease and details of your medicine and dosage regimens.  This medicine may cause constipation. Try to have a bowel movement at least every 2 to 3 days. If you do not have a bowel movement for 3 days, call your doctor or health care professional.  Your mouth may get dry. Chewing sugarless gum or sucking hard candy, and drinking plenty of water may help. Contact your doctor if the problem does not go away or is severe.  NOTE:This sheet is a summary. It may not cover all possible information. If you have questions about this medicine, talk to your doctor, pharmacist, or health care provider. Copyright© 2017 [employer]

## 2018-05-21 NOTE — PROGRESS NOTES
Subjective:       Patient ID: Mingo Rico is a 38 y.o. male.    Chief Complaint: Medication Refill    HPI:  39 y/o WM with hx of OUD in remission, as well as ADD, here for follow up,  Pt. States initially got addicted on opiates many years secondary to congenital scoliosis and LBP, on Suboxone for 10 years, gradually tapering , uses one film  In 3 pieces/day. Denies any relapse, but since did nit get Rx is having some craving.  Pt. Is a  used to work for a reknown company for 10 years, presently laid off x 5 months, looking for job. lives with spouse and 2 kids  Take Adderall on prn basis when he feels unable to concentrate and  Focus.     Review of Systems    Objective:      Physical Exam   Constitutional: He is oriented to person, place, and time. He appears well-developed and well-nourished. He is cooperative.  Non-toxic appearance. He does not appear ill. No distress.   HENT:   Head: Normocephalic and atraumatic.   Right Ear: Hearing, tympanic membrane, external ear and ear canal normal.   Left Ear: Hearing, tympanic membrane, external ear and ear canal normal.   Nose: Nose normal. No mucosal edema, rhinorrhea or nasal deformity. No epistaxis. Right sinus exhibits no maxillary sinus tenderness and no frontal sinus tenderness. Left sinus exhibits no maxillary sinus tenderness and no frontal sinus tenderness.   Mouth/Throat: Uvula is midline, oropharynx is clear and moist and mucous membranes are normal. No trismus in the jaw. Normal dentition. No uvula swelling. No posterior oropharyngeal erythema.   Eyes: Conjunctivae and lids are normal. Right eye exhibits no discharge. Left eye exhibits no discharge. No scleral icterus.   Sclera clear bilat   Neck: Trachea normal, normal range of motion, full passive range of motion without pain and phonation normal. Neck supple.   Cardiovascular: Normal rate, regular rhythm, normal heart sounds, intact distal pulses and normal pulses.  Exam reveals no  friction rub.    No murmur heard.  Pulmonary/Chest: Effort normal and breath sounds normal. No respiratory distress. He has no wheezes. He has no rales.   Abdominal: Soft. Normal appearance and bowel sounds are normal. He exhibits no distension, no pulsatile midline mass and no mass. There is no tenderness.   Musculoskeletal: Normal range of motion. He exhibits no edema or deformity.   Mild scoliosis seen   Lymphadenopathy:     He has no cervical adenopathy.   Neurological: He is alert and oriented to person, place, and time. He exhibits normal muscle tone. Coordination normal.   Skin: Skin is warm, dry and intact. He is not diaphoretic. No pallor.   Psychiatric: He has a normal mood and affect. His speech is normal and behavior is normal. Judgment and thought content normal. Cognition and memory are normal.   Nursing note and vitals reviewed.      Assessment:       1. Opioid type dependence in remission    2. Attention deficit disorder of adult    3. Opioid dependence in remission        Plan:         Continue Suboxone 8/2 Film once daily, with instruction to gradually taper the dose. Also discussed attending AA meetings and bring in report  Cont Adderall 30mg once daily, no refill given. Agrees to take only prn  PALMP reviewed ,good compliance  RTC in 3 months

## 2018-08-15 ENCOUNTER — OFFICE VISIT (OUTPATIENT)
Dept: FAMILY MEDICINE | Facility: CLINIC | Age: 38
End: 2018-08-15
Payer: MEDICAID

## 2018-08-15 VITALS
OXYGEN SATURATION: 99 % | HEART RATE: 82 BPM | DIASTOLIC BLOOD PRESSURE: 74 MMHG | SYSTOLIC BLOOD PRESSURE: 108 MMHG | BODY MASS INDEX: 29.72 KG/M2 | TEMPERATURE: 99 F | WEIGHT: 231.5 LBS

## 2018-08-15 DIAGNOSIS — F11.21 OPIOID TYPE DEPENDENCE IN REMISSION: Primary | ICD-10-CM

## 2018-08-15 PROCEDURE — 99213 OFFICE O/P EST LOW 20 MIN: CPT | Mod: S$PBB,,, | Performed by: FAMILY MEDICINE

## 2018-08-15 PROCEDURE — 99999 PR PBB SHADOW E&M-EST. PATIENT-LVL III: CPT | Mod: PBBFAC,,, | Performed by: FAMILY MEDICINE

## 2018-08-15 PROCEDURE — 99213 OFFICE O/P EST LOW 20 MIN: CPT | Mod: PBBFAC,PN | Performed by: FAMILY MEDICINE

## 2018-08-15 RX ORDER — BUPRENORPHINE HYDROCHLORIDE AND NALOXONE HYDROCHLORIDE DIHYDRATE 8; 2 MG/1; MG/1
TABLET SUBLINGUAL
Qty: 30 TABLET | Refills: 2 | Status: SHIPPED | OUTPATIENT
Start: 2018-08-15

## 2018-08-15 NOTE — PROGRESS NOTES
Subjective:       Patient ID: Mingo Rico is a 38 y.o. male.    Chief Complaint: Medication Refill    37 y/o male with hx of OUD, on opiod agonist, suboxone once daily for a while, trying to taper off. Denies any craving or relapse. Admits still takes Adderral on prn basis, but trying to get off of it.  Denies ETOH or any other drug use  Willing to join counselling.Will go and attend in meetings in [place] , [place]  Trying to find a job, presently unemployed.  Non smoker        Review of Systems    Objective:      Physical Exam   Constitutional: He is oriented to person, place, and time. He appears well-developed and well-nourished. He is cooperative.  Non-toxic appearance. He does not appear ill. No distress.   HENT:   Head: Normocephalic and atraumatic.   Right Ear: Hearing, tympanic membrane, external ear and ear canal normal.   Left Ear: Hearing, tympanic membrane, external ear and ear canal normal.   Nose: Nose normal. No mucosal edema, rhinorrhea or nasal deformity. No epistaxis. Right sinus exhibits no maxillary sinus tenderness and no frontal sinus tenderness. Left sinus exhibits no maxillary sinus tenderness and no frontal sinus tenderness.   Mouth/Throat: Uvula is midline, oropharynx is clear and moist and mucous membranes are normal. No trismus in the jaw. Normal dentition. No uvula swelling. No posterior oropharyngeal erythema.   Eyes: Conjunctivae and lids are normal. Right eye exhibits no discharge. Left eye exhibits no discharge. No scleral icterus.   Sclera clear bilat   Neck: Trachea normal, normal range of motion, full passive range of motion without pain and phonation normal. Neck supple.   Cardiovascular: Normal rate, regular rhythm, normal heart sounds, intact distal pulses and normal pulses. Exam reveals no friction rub.   No murmur heard.  Pulmonary/Chest: Effort normal and breath sounds normal. No stridor.   Abdominal: Soft. Normal appearance and bowel sounds are normal. He exhibits no  distension, no pulsatile midline mass and no mass. There is no tenderness.   Musculoskeletal: Normal range of motion. He exhibits no edema or deformity.   Lymphadenopathy:     He has no cervical adenopathy.   Neurological: He is alert and oriented to person, place, and time. He exhibits normal muscle tone. Coordination normal.   Skin: Skin is warm, dry and intact. He is not diaphoretic. No pallor.   Psychiatric: He has a normal mood and affect. His speech is normal and behavior is normal. Judgment and thought content normal. Cognition and memory are normal.   Nursing note and vitals reviewed.      Assessment:       1. Opioid type dependence in remission        Plan:         Mingo was seen today for medication refill.    Diagnoses and all orders for this visit:    Opioid type dependence in remission  -     buprenorphine-naloxone 8-2 mg (SUBOXONE) 8-2 mg Subl; Take one film daily as needed    Disscussed at length try to taper off of medication  RTC in 2 months

## 2018-08-15 NOTE — PATIENT INSTRUCTIONS
Buprenorphine; Naloxone oral dissolving film  What is this medicine?  BUPRENORPHINE; NALOXONE (byoo pre NOR feen; nal OX one) is used to treat certain types of drug dependence.  How should I use this medicine?  For sublingual use (Suboxone): Drink water to moisten the mouth. Then, place the medicine under the tongue and let it dissolve. Follow the directions on the prescription label. Leave this medicine in the sealed foil pack until you are ready to use it. If your dose requires you to take more than 1 film, place the second film under the tongue on other side of the mouth. If your dose requires you to take more than 2 films, place the third film under your tongue on either side after the first 2 films have dissolved. Do not let the films touch in your mouth. After you put this medicine in your mouth, do not move it. Do not swallow, cut, or chew the film. Take your medicine at regular intervals. Do not take your medicine more often than directed.  For buccal use (Suboxone or Bunavail): Drink water to moisten the inside of the cheek. Then, place the medicine against the inside of the moistened cheek and let it dissolve. Follow the directions on the prescription label. Leave this medicine in the sealed foil pack until you are ready to use it. If your dose requires you to take more than 1 film, place the second film on the inside of the other cheek. If your dose requires you to take more than 2 films, place the third film on the inside of your right or left cheek after the first 2 films have dissolved. After you put this medicine in your mouth, do not move it. Do not swallow, cut, or chew the film. Take your medicine at regular intervals. Do not take your medicine more often than directed.  A special MedGuide will be given to you by the pharmacist with each prescription and refill. Be sure to read this information carefully each time.  Talk to your pediatrician regarding the use of this medicine in children. Special  care may be needed.  What side effects may I notice from receiving this medicine?  Side effects that you should report to your doctor or health care professional as soon as possible:  · allergic reactions like skin rash, itching or hives, swelling of the face, lips, or tongue  · breathing problems  · confusion  · signs and symptoms of a dangerous change in heartbeat or heart rhythm like chest pain; dizziness; fast or irregular heartbeat; palpitations; feeling faint or lightheaded, falls; breathing problems  · signs and symptoms of liver injury like dark yellow or brown urine; general ill feeling or flu-like symptoms; light-colored stools; loss of appetite; nausea; right upper belly pain; unusually weak or tired; yellow of the eyes or skin  · signs and symptoms of low blood pressure like dizziness; feeling faint or lightheaded, falls; unusually weak or tired  · trouble passing urine or change in the amount of urine  Side effects that usually do not require medical attention (report these to your doctor or health care professional if they continue or are bothersome):  · constipation  · dry mouth  · nausea, vomiting  · tiredness  What may interact with this medicine?  Do not take this medication with any of the following medicines:  · cisapride  · certain medicines for fungal infections like ketoconazole and itraconazole  · dofetilide  · dronedarone  · pimozide  · ritonavir  · thioridazine  · ziprasidone  This medicine may interact with the following medications:  · alcohol  · antihistamines for allergy, cough and cold  · antiviral medicines for HIV or AIDS  · atropine  · certain antibiotics like clarithromycin, erythromycin, linezold, rifampin  · certain medicines for anxiety or sleep  · certain medicines for bladder problems like oxybutynin, tolterodine  · certain medicines for depression like amitriptyline, fluoxetine, sertraline  · certain medicines for migraine headache like almotriptan, eletriptan, frovatriptan,  naratriptan, rizatriptan, sumatriptan, zolmitriptan  · certain medicines for nausea or vomiting like dolasetron, ondansetron, palonosetron  · certain medicines for Parkinson's disease like benztropine, trihexyphenidyl  · certain medicines for seizures like phenobarbital, primidone  · certain medicines for stomach problems like cimetidine, dicyclomine, hyoscyamine  · certain medicines for travel sickness like scopolamine  · diuretics  · general anesthetics like halothane, isoflurane, methoxyflurane, propofol  · ipratropium  · local anesthetics like lidocaine, pramoxine, tetracaine  · MAOIs like Carbex, Eldepryl, Marplan, Nardil, and Parnate  · medicines that relax muscles for surgery  · methylene blue  · other medicines that prolong the QT interval (cause an abnormal heart rhythm)  · other narcotic medicines for pain or cough  · phenothiazines like chlorpromazine, mesoridazine, prochlorperazine, thioridazine  What if I miss a dose?  If you miss a dose, take it as soon as you can. If it is almost time for your next dose, take only that dose. Do not take double or extra doses.  Where should I keep my medicine?  Keep out of the reach of children. This medicine can be abused. Keep your medicine in a safe place to protect it from theft. Do not share this medicine with anyone. Selling or giving away this medicine is dangerous and against the law.  Store at room temperature between 15 and 30 degrees C (56 and 86 degrees F).  This medicine may cause accidental overdose and death if it is taken by other adults, children, or pets. Remove any unused films from the foil pack and flush them down the toilet to reduce the chance of harm. Do not use the medicine after the expiration date.  What should I tell my health care provider before I take this medicine?  They need to know if you have any of these conditions:  · brain tumor  · drink more than 3 alcohol-containing drinks per day  · head injury  · heart disease  · kidney  disease  · liver disease  · lung or breathing disease, like asthma  · an unusual or allergic reaction to buprenorphine, naloxone, other medicines, foods, dyes, or preservatives  · pregnant or trying to get pregnant  · breast-feeding  What should I watch for while using this medicine?  Tell your doctor or healthcare professional if your symptoms do not start to get better or if they get worse.  Do not stop taking except on your doctor's advice. You may develop a severe reaction. Your doctor will tell you how much medicine to take.  If you are also taking a narcotic medicine for pain or cough or another medicine that also causes drowsiness, you may have more side effects. Give your health care provider a list of all medicines you use. Your doctor will tell you how much medicine to take. Do not take more medicine than directed. Call emergency for help if you have problems breathing or unusual sleepiness.  You may get drowsy or dizzy. Do not drive, use machinery, or do anything that needs mental alertness until you know how this medicine affects you. Do not stand or sit up quickly, especially if you are an older patient. This reduces the risk of dizzy or fainting spells. Alcohol may interfere with the effect of this medicine. Avoid alcoholic drinks.  Wear a medical ID bracelet or chain, and carry a card that describes your disease and details of your medicine and dosage regimens.  This medicine may cause constipation. Try to have a bowel movement at least every 2 to 3 days. If you do not have a bowel movement for 3 days, call your doctor or health care professional.  Your mouth may get dry. Chewing sugarless gum or sucking hard candy, and drinking plenty of water may help. Contact your doctor if the problem does not go away or is severe.  NOTE:This sheet is a summary. It may not cover all possible information. If you have questions about this medicine, talk to your doctor, pharmacist, or health care provider. Copyright©  2017 [employer]

## 2018-08-17 ENCOUNTER — TELEPHONE (OUTPATIENT)
Dept: FAMILY MEDICINE | Facility: CLINIC | Age: 38
End: 2018-08-17

## 2018-08-17 NOTE — TELEPHONE ENCOUNTER
Pt states he dropped script [facility] to [facility] off, phoned, left message for PA info to be faxed to [phone]   attn: Jackie, awaiting ins info to do PA

## 2018-08-17 NOTE — TELEPHONE ENCOUNTER
Attempt to contact pt to find out what pharmacy he dropped script to, we have not been notified of PA needed.

## 2018-08-17 NOTE — TELEPHONE ENCOUNTER
----- Message from Skylar Allison sent at 8/17/2018 11:00 AM CDT -----  Contact: Self/ [phone]  Patient called to check on the prior authorization for his medication buprenorphine-naloxone 8-2 mg (SUBOXONE) 8-2 mg Subl. Pharmacy has not received prior authorization to fill the medication.    Please call and advise.

## 2018-11-22 ENCOUNTER — NURSE TRIAGE (OUTPATIENT)
Dept: ADMINISTRATIVE | Facility: CLINIC | Age: 38
End: 2018-11-22

## 2018-11-22 ENCOUNTER — HOSPITAL ENCOUNTER (EMERGENCY)
Facility: HOSPITAL | Age: 38
Discharge: HOME | End: 2018-11-22
Attending: SURGERY
Payer: MEDICAID

## 2018-11-22 VITALS
SYSTOLIC BLOOD PRESSURE: 125 MMHG | BODY MASS INDEX: 28.22 KG/M2 | OXYGEN SATURATION: 98 % | HEART RATE: 80 BPM | TEMPERATURE: 97 F | RESPIRATION RATE: 16 BRPM | WEIGHT: 219.81 LBS | DIASTOLIC BLOOD PRESSURE: 82 MMHG

## 2018-11-22 DIAGNOSIS — G47.00 INSOMNIA, UNSPECIFIED TYPE: ICD-10-CM

## 2018-11-22 DIAGNOSIS — G89.29 OTHER CHRONIC PAIN: ICD-10-CM

## 2018-11-22 DIAGNOSIS — F43.9 STRESS: ICD-10-CM

## 2018-11-22 DIAGNOSIS — F11.29 OPIOID DEPENDENCE WITH OPIOID-INDUCED DISORDER: Primary | ICD-10-CM

## 2018-11-22 LAB
AMPHET+METHAMPHET UR QL: NEGATIVE
BARBITURATES UR QL SCN>200 NG/ML: NEGATIVE
BENZODIAZ UR QL SCN>200 NG/ML: NEGATIVE
BILIRUB UR QL STRIP: NEGATIVE
BZE UR QL SCN: NEGATIVE
CANNABINOIDS UR QL SCN: NORMAL
CLARITY UR: CLEAR
COLOR UR: YELLOW
CREAT UR-MCNC: 179.9 MG/DL
GLUCOSE UR QL STRIP: NEGATIVE
HGB UR QL STRIP: NEGATIVE
KETONES UR QL STRIP: NEGATIVE
LEUKOCYTE ESTERASE UR QL STRIP: NEGATIVE
METHADONE UR QL SCN>300 NG/ML: NEGATIVE
NITRITE UR QL STRIP: NEGATIVE
OPIATES UR QL SCN: NEGATIVE
PCP UR QL SCN>25 NG/ML: NEGATIVE
PH UR STRIP: 5 [PH] (ref 5–8)
PROT UR QL STRIP: NEGATIVE
SP GR UR STRIP: 1.02 (ref 1–1.03)
TOXICOLOGY INFORMATION: NORMAL
URN SPEC COLLECT METH UR: NORMAL
UROBILINOGEN UR STRIP-ACNC: NEGATIVE EU/DL

## 2018-11-22 PROCEDURE — 99284 EMERGENCY DEPT VISIT MOD MDM: CPT | Mod: 25

## 2018-11-22 PROCEDURE — 81003 URINALYSIS AUTO W/O SCOPE: CPT | Mod: 59

## 2018-11-22 PROCEDURE — 96372 THER/PROPH/DIAG INJ SC/IM: CPT

## 2018-11-22 PROCEDURE — 63600175 PHARM REV CODE 636 W HCPCS: Performed by: SURGERY

## 2018-11-22 PROCEDURE — 80307 DRUG TEST PRSMV CHEM ANLYZR: CPT

## 2018-11-22 RX ORDER — TRAZODONE HYDROCHLORIDE 50 MG/1
50 TABLET ORAL NIGHTLY
Qty: 10 TABLET | Refills: 0 | Status: SHIPPED | OUTPATIENT
Start: 2018-11-22 | End: 2019-11-22

## 2018-11-22 RX ORDER — TRAZODONE HYDROCHLORIDE 50 MG/1
50 TABLET ORAL NIGHTLY
Qty: 10 TABLET | Refills: 0 | Status: SHIPPED | OUTPATIENT
Start: 2018-11-22 | End: 2018-11-22 | Stop reason: SDUPTHER

## 2018-11-22 RX ORDER — MORPHINE SULFATE 4 MG/ML
4 INJECTION, SOLUTION INTRAMUSCULAR; INTRAVENOUS
Status: COMPLETED | OUTPATIENT
Start: 2018-11-22 | End: 2018-11-22

## 2018-11-22 RX ORDER — CYCLOBENZAPRINE HCL 10 MG
10 TABLET ORAL 3 TIMES DAILY PRN
Qty: 10 TABLET | Refills: 0 | Status: SHIPPED | OUTPATIENT
Start: 2018-11-22 | End: 2018-11-22 | Stop reason: SDUPTHER

## 2018-11-22 RX ORDER — CYCLOBENZAPRINE HCL 10 MG
10 TABLET ORAL 3 TIMES DAILY PRN
Qty: 10 TABLET | Refills: 0 | Status: SHIPPED | OUTPATIENT
Start: 2018-11-22 | End: 2018-11-27

## 2018-11-22 RX ORDER — IBUPROFEN 800 MG/1
800 TABLET ORAL EVERY 6 HOURS PRN
Qty: 20 TABLET | Refills: 0 | Status: SHIPPED | OUTPATIENT
Start: 2018-11-22

## 2018-11-22 RX ORDER — IBUPROFEN 800 MG/1
800 TABLET ORAL EVERY 6 HOURS PRN
Qty: 20 TABLET | Refills: 0 | Status: SHIPPED | OUTPATIENT
Start: 2018-11-22 | End: 2018-11-22 | Stop reason: SDUPTHER

## 2018-11-22 RX ORDER — ONDANSETRON 2 MG/ML
4 INJECTION INTRAMUSCULAR; INTRAVENOUS
Status: COMPLETED | OUTPATIENT
Start: 2018-11-22 | End: 2018-11-22

## 2018-11-22 RX ADMIN — ONDANSETRON 4 MG: 2 INJECTION INTRAMUSCULAR; INTRAVENOUS at 02:11

## 2018-11-22 RX ADMIN — MORPHINE SULFATE 4 MG: 4 INJECTION, SOLUTION INTRAMUSCULAR; INTRAVENOUS at 02:11

## 2018-11-22 NOTE — ED PROVIDER NOTES
[facility]                                                 Chief Complaint  38 y.o. male with Back Pain    History of Present Illness  Mingo Rico presents to the emergency room with low back pain today  Patient has low back pain chronically, history of Suboxone use for addiction  Patient states that he ran out of his Suboxone last week, chronic pain today  Patient denies any new injury or trauma, grossly normal physical exam now  Patient states he is going through divorce with his wife, heavy stress today  Patient is not suicidal homicidal, he is only and chronic pain on a daily basis  Patient states he is having trouble sleeping due to his divorce and stress  Patient is requesting a shot of pain medication until he can see his MD  The patient is also requesting a sleep aid to help at night due to his divorce    The history is provided by the patient   device was not used during this ER visit  Medical history: History of prescription drug abuse and opiate dependence  Surgeries: Ankle osteomyelitis  No Known Allergies     Review of Systems and Physical Exam      Review of Systems  -- Constitution - no fever, denies fatigue, no weakness, no chills  -- Eyes - no tearing or redness, no visual disturbance  -- Ear, Nose - no tinnitus or earache, no nasal congestion or discharge  -- Mouth,Throat - no sore throat, no toothache, normal voice, normal swallowing  -- Respiratory - denies cough and congestion, no shortness of breath, no GODOY  -- Cardiovascular - denies chest pain, no palpitations, denies claudication  -- Gastrointestinal - denies abdominal pain, nausea, vomiting, or diarrhea  -- Genitourinary - no dysuria, no hematuria, no flank pain, no bladder pain  -- Musculoskeletal - chronic back and joint pain  -- Neurological - no headache, denies weakness or seizure; no LOC  -- Skin - denies pallor, rash, or changes in skin. no hives or welts noted    Vital Signs  His oral  temperature is 97.4 °F (36.3 °C).   His blood pressure is 129/86 and his pulse is 81.   His respiration is 16 and oxygen saturation is 98%.     Physical Exam  -- Nursing note and vitals reviewed  -- Constitutional: Appears well-developed and well-nourished  -- Head: Atraumatic. Normocephalic. No obvious abnormality  -- Eyes: Pupils are equal and reactive to light. Normal conjunctiva and lids  -- Neck: Normal range of motion. Neck supple. No masses, trachea midline  -- Cardiac: Normal rate, regular rhythm and normal heart sounds  -- Pulmonary: Normal respiratory effort, breath sounds clear to auscultation  -- Abdominal: Soft, no tenderness. Normal bowel sounds. Normal liver edge  -- Musculoskeletal: Normal range of motion, no effusions. Joints stable   -- Neurological: No focal deficits. Showed good interaction with staff  -- Skin: Warm and dry. No evidence of rash or cellulitis    Emergency Room Course      Treatment and Evaluation  -- Urinalysis performed during this ER visit showed no signs of infection  -- Urine drug screen in the ER today was positive for marijuana  -- IM 4 mg Morphine given in the ER  -- IM 4 mg Zofran given today in the ER     Diagnosis  -- Opioid dependence  -- Insomnia  -- Other chronic pain  -- Stress    Disposition and Plan  -- Disposition: home  -- Condition: stable  -- Follow-up: Patient to follow up with MD in 1-2 days.  -- I advised the patient that we have found no life threatening condition today  -- At this time, I believe the patient is clinically stable for discharge.   -- The patient acknowledges that close follow up with a MD is required   -- Patient agrees to comply with all instruction and direction given in the ER    This note is dictated on M*Modal word recognition program.  There are word recognition mistakes that are occasionally missed on review.          Azael Richard MD  11/22/18 [id number]

## 2018-11-22 NOTE — TELEPHONE ENCOUNTER
Mother called to report the following:     -patient is in a lot of pain   -and would like to detox and he is totally out suboxone   -would like to talk to Dr. Decker about calling in refill   -Dr. Decker is not on call per    -advised to report to ED       Reason for Disposition   [1] Caller requests to speak ONLY to PCP AND [2] URGENT question    Protocols used: ST PCP CALL - NO TRIAGE-A-AH

## 2025-06-16 ENCOUNTER — OFFICE VISIT (OUTPATIENT)
Dept: URGENT CARE | Facility: CLINIC | Age: 45
End: 2025-06-16

## 2025-06-16 VITALS
DIASTOLIC BLOOD PRESSURE: 79 MMHG | TEMPERATURE: 98 F | OXYGEN SATURATION: 99 % | HEART RATE: 79 BPM | BODY MASS INDEX: 29.52 KG/M2 | WEIGHT: 230 LBS | HEIGHT: 74 IN | RESPIRATION RATE: 17 BRPM | SYSTOLIC BLOOD PRESSURE: 119 MMHG

## 2025-06-16 DIAGNOSIS — L02.91 ABSCESS: Primary | ICD-10-CM

## 2025-06-16 PROCEDURE — 10060 I&D ABSCESS SIMPLE/SINGLE: CPT | Mod: TIER,S$GLB,,

## 2025-06-16 PROCEDURE — 99204 OFFICE O/P NEW MOD 45 MIN: CPT | Mod: TIER,25,S$GLB,

## 2025-06-16 RX ORDER — MUPIROCIN 20 MG/G
OINTMENT TOPICAL 3 TIMES DAILY
Qty: 15 G | Refills: 0 | Status: SHIPPED | OUTPATIENT
Start: 2025-06-16

## 2025-06-16 RX ORDER — DOXYCYCLINE 100 MG/1
100 CAPSULE ORAL EVERY 12 HOURS
Qty: 20 CAPSULE | Refills: 0 | Status: SHIPPED | OUTPATIENT
Start: 2025-06-16 | End: 2025-06-26

## 2025-06-16 NOTE — PROCEDURES
"Incision & Drainage    Date/Time: 6/16/2025 3:00 PM    Performed by: Shala Child NP  Authorized by: Shala Child NP    Time out: Immediately prior to procedure a "time out" was called to verify the correct patient, procedure, equipment, support staff and site/side marked as required.    Consent Done?:  Yes (Verbal)    Type:  Abscess  Body area:  Lower extremity  Location details:  Right leg  Anesthesia:  Local infiltration  Local anesthetic: Lidocaine 1% without epinephrine  Scalpel size:  11  Incision type:  Single straight  Incision depth: fascia    Complexity:  Simple  Drainage:  Bloody and purulent  Drainage amount:  Moderate  Wound treatment:  Incision, drainage, wound left open, deloculation and expression of material  Packing material:  None  Patient tolerance:  Patient tolerated the procedure well with no immediate complications  Pain Assessment: 1    " [Appropriately responsive] : appropriately responsive [Alert] : alert [No Acute Distress] : no acute distress [Oriented x3] : oriented x3

## 2025-06-16 NOTE — PATIENT INSTRUCTIONS
1.  Complete the course of antibiotics. Keep the wound clean and dry.  You may apply Bactroban 3x daily to incision site.  Monitor the abscess closely for any signs of worsening infection.  Monitor your right knee for any signs that the infection has spread to your joint.  See attached handout for more information regarding your condition and how to manage it.  2.  Rest and keep yourself/patient well hydrated.  3.  You can alternate Tylenol and Motrin every 4-6 hours for fever above 100.4F and/or pain.   4. You should schedule a follow-up appointment with your Primary Care Provider for recheck in 2-3 days or as directed at this visit.   5.  If your condition fails to improve in a timely manner, you should receive another evaluation by your Primary Care Provider to discuss your concerns or return to urgent care for a recheck.  If your condition worsens at any time, you should report immediately to your nearest Emergency Department for further evaluation. **You must understand that you have received Urgent Care treatment only and that you may be released before all of your medical problems are known or treated. You, the patient, are responsible to arrange for follow-up care as instructed.

## 2025-06-16 NOTE — PROGRESS NOTES
"Subjective:      Patient ID: Mingo Rico is a 45 y.o. male.    Vitals:  height is 6' 2" (1.88 m) and weight is 104.3 kg (230 lb). His oral temperature is 98.4 °F (36.9 °C). His blood pressure is 119/79 and his pulse is 79. His respiration is 17 and oxygen saturation is 99%.     Chief Complaint: Knee Pain    44 yo M here with complaint of an abscess to his R knee.  Per patient, he has had screws put in his right fibula from a fracture about 10 years ago.  He states the pain in his right knee started about 2 weeks ago and the abscess formed a few days ago.    Abscess  Chronicity:  NewProgression Since Onset: gradually worsening  Location:  Leg  Associated Symptoms: no fever, no chills, no sweats  Characteristics: painful, redness and swelling    Treatments Tried:  Nothing  Relieved by:  Nothing  Worsened by:  Nothing      Constitution: Negative for chills and fever.   Skin:  Positive for abscess.      Objective:     Physical Exam   Constitutional: He is oriented to person, place, and time.  Non-toxic appearance. He does not appear ill. No distress.   HENT:   Head: Normocephalic and atraumatic.   Ears:   Right Ear: External ear normal.   Left Ear: External ear normal.   Nose: Nose normal.   Eyes: Conjunctivae are normal.   Cardiovascular: Normal rate.   Pulmonary/Chest: Effort normal.   Abdominal: Normal appearance.   Musculoskeletal:        Legs:    Neurological: He is alert and oriented to person, place, and time.   Skin: Skin is warm, dry, not diaphoretic and abscessed. Capillary refill takes less than 2 seconds.   Psychiatric: His behavior is normal.   Nursing note and vitals reviewed.      Assessment:     1. Abscess        Plan:       Abscess  -     doxycycline (VIBRAMYCIN) 100 MG Cap; Take 1 capsule (100 mg total) by mouth every 12 (twelve) hours. for 10 days  Dispense: 20 capsule; Refill: 0  -     mupirocin (BACTROBAN) 2 % ointment; Apply topically 3 (three) times daily.  Dispense: 15 g; Refill: 0  -     " Incision & Drainage      Patient Instructions   1.  Complete the course of antibiotics. Keep the wound clean and dry.  You may apply Bactroban 3x daily to incision site.  Monitor the abscess closely for any signs of worsening infection.  Monitor your right knee for any signs that the infection has spread to your joint.  See attached handout for more information regarding your condition and how to manage it.  2.  Rest and keep yourself/patient well hydrated.  3.  You can alternate Tylenol and Motrin every 4-6 hours for fever above 100.4F and/or pain.   4. You should schedule a follow-up appointment with your Primary Care Provider for recheck in 2-3 days or as directed at this visit.   5.  If your condition fails to improve in a timely manner, you should receive another evaluation by your Primary Care Provider to discuss your concerns or return to urgent care for a recheck.  If your condition worsens at any time, you should report immediately to your nearest Emergency Department for further evaluation. **You must understand that you have received Urgent Care treatment only and that you may be released before all of your medical problems are known or treated. You, the patient, are responsible to arrange for follow-up care as instructed.